# Patient Record
Sex: FEMALE | Race: OTHER | URBAN - METROPOLITAN AREA
[De-identification: names, ages, dates, MRNs, and addresses within clinical notes are randomized per-mention and may not be internally consistent; named-entity substitution may affect disease eponyms.]

---

## 2017-04-21 ENCOUNTER — APPOINTMENT (RX ONLY)
Dept: URBAN - METROPOLITAN AREA CLINIC 39 | Facility: CLINIC | Age: 52
Setting detail: DERMATOLOGY
End: 2017-04-21

## 2017-04-21 DIAGNOSIS — L40.0 PSORIASIS VULGARIS: ICD-10-CM | Status: WELL CONTROLLED

## 2017-04-21 PROCEDURE — ? PRESCRIPTION

## 2017-04-21 PROCEDURE — 99213 OFFICE O/P EST LOW 20 MIN: CPT

## 2017-04-21 RX ORDER — CALCIPOTRIENE AND BETAMETHASONE DIPROPIONATE 50; .5 UG/G; MG/G
OINTMENT TOPICAL
Qty: 1 | Refills: 7 | Status: ERX

## 2017-04-21 ASSESSMENT — LOCATION DETAILED DESCRIPTION DERM
LOCATION DETAILED: RIGHT DISTAL PRETIBIAL REGION
LOCATION DETAILED: LEFT PROXIMAL PRETIBIAL REGION
LOCATION DETAILED: LEFT ELBOW

## 2017-04-21 ASSESSMENT — LOCATION SIMPLE DESCRIPTION DERM
LOCATION SIMPLE: LEFT ELBOW
LOCATION SIMPLE: LEFT PRETIBIAL REGION
LOCATION SIMPLE: RIGHT PRETIBIAL REGION

## 2017-04-21 ASSESSMENT — BSA PSORIASIS: % BODY COVERED IN PSORIASIS: 2

## 2017-04-21 ASSESSMENT — LOCATION ZONE DERM
LOCATION ZONE: LEG
LOCATION ZONE: ARM

## 2017-04-21 ASSESSMENT — PGA PSORIASIS: PGA PSORIASIS: MILD (MILD PLAQUE ELEVATION, LIGHT ERYTHEMA, FINE SCALE PREDOMINATES)

## 2017-12-28 ENCOUNTER — APPOINTMENT (RX ONLY)
Dept: URBAN - METROPOLITAN AREA CLINIC 39 | Facility: CLINIC | Age: 52
Setting detail: DERMATOLOGY
End: 2017-12-28

## 2017-12-28 DIAGNOSIS — L70.0 ACNE VULGARIS: ICD-10-CM | Status: INADEQUATELY CONTROLLED

## 2017-12-28 PROCEDURE — ? COUNSELING

## 2017-12-28 PROCEDURE — ? TREATMENT REGIMEN

## 2017-12-28 PROCEDURE — ? PRESCRIPTION

## 2017-12-28 PROCEDURE — 99213 OFFICE O/P EST LOW 20 MIN: CPT

## 2017-12-28 RX ORDER — DOXYCYCLINE 40 MG/1
CAPSULE ORAL
Qty: 30 | Refills: 1 | Status: ERX | COMMUNITY
Start: 2017-12-28

## 2017-12-28 RX ADMIN — DOXYCYCLINE: 40 CAPSULE ORAL at 00:00

## 2017-12-28 ASSESSMENT — LOCATION SIMPLE DESCRIPTION DERM: LOCATION SIMPLE: LEFT CHEEK

## 2017-12-28 ASSESSMENT — LOCATION DETAILED DESCRIPTION DERM: LOCATION DETAILED: LEFT INFERIOR CENTRAL MALAR CHEEK

## 2017-12-28 ASSESSMENT — LOCATION ZONE DERM: LOCATION ZONE: FACE

## 2017-12-28 NOTE — HPI: PIMPLES (ACNE)
How Severe Is Your Acne?: mild
Is This A New Presentation, Or A Follow-Up?: Acne
Additional Comments (Use Complete Sentences): She was called in Rx about 3 weeks ago for benzoyl peroxide and clindamycin and she states that Rx’s are really not working well.

## 2017-12-28 NOTE — PROCEDURE: MIPS QUALITY
Quality 111:Pneumonia Vaccination Status For Older Adults: Pneumococcal Vaccination not Administered or Previously Received, Reason not Otherwise Specified
Quality 431: Preventive Care And Screening: Unhealthy Alcohol Use - Screening: Patient screened for unhealthy alcohol use using a single question and scores less than 2 times per year
Quality 130: Documentation Of Current Medications In The Medical Record: Current Medications Documented
Quality 131: Pain Assessment And Follow-Up: Pain assessment using a standardized tool is documented as negative, no follow-up plan required
Detail Level: Detailed
Quality 226: Preventive Care And Screening: Tobacco Use: Screening And Cessation Intervention: Patient screened for tobacco and never smoked
Quality 110: Preventive Care And Screening: Influenza Immunization: Influenza Immunization Administered during Influenza season

## 2021-09-28 ENCOUNTER — APPOINTMENT (RX ONLY)
Dept: URBAN - METROPOLITAN AREA CLINIC 39 | Facility: CLINIC | Age: 56
Setting detail: DERMATOLOGY
End: 2021-09-28

## 2021-09-28 DIAGNOSIS — L40.0 PSORIASIS VULGARIS: ICD-10-CM | Status: INADEQUATELY CONTROLLED

## 2021-09-28 PROCEDURE — ? DIAGNOSIS COMMENT

## 2021-09-28 PROCEDURE — ? ADDITIONAL NOTES

## 2021-09-28 PROCEDURE — ? PRESCRIPTION MEDICATION MANAGEMENT

## 2021-09-28 PROCEDURE — ? COUNSELING

## 2021-09-28 PROCEDURE — 99204 OFFICE O/P NEW MOD 45 MIN: CPT

## 2021-09-28 PROCEDURE — ? PRESCRIPTION

## 2021-09-28 RX ORDER — CALCIPOTRIENE AND BETAMETHASONE DIPROPIONATE 50; .5 UG/G; MG/G
AEROSOL, FOAM TOPICAL
Qty: 60 | Refills: 6 | Status: ERX | COMMUNITY
Start: 2021-09-28

## 2021-09-28 RX ADMIN — CALCIPOTRIENE AND BETAMETHASONE DIPROPIONATE: 50; .5 AEROSOL, FOAM TOPICAL at 00:00

## 2021-09-28 ASSESSMENT — BSA PSORIASIS: % BODY COVERED IN PSORIASIS: 30

## 2021-09-28 ASSESSMENT — PGA PSORIASIS: PGA PSORIASIS 2020: MODERATE

## 2021-09-28 NOTE — PROCEDURE: DIAGNOSIS COMMENT
Comment: Appointment with rheumatologist in November to EVAL for arthritis
Render Risk Assessment In Note?: no
Detail Level: Simple

## 2023-12-19 ENCOUNTER — HOSPITAL ENCOUNTER (EMERGENCY)
Facility: CLINIC | Age: 58
Discharge: HOME | End: 2023-12-19
Admitting: EMERGENCY MEDICINE

## 2023-12-19 ENCOUNTER — APPOINTMENT (OUTPATIENT)
Dept: GENERAL RADIOLOGY | Facility: CLINIC | Age: 58
End: 2023-12-19
Attending: EMERGENCY MEDICINE

## 2023-12-19 PROCEDURE — 99281 EMR DPT VST MAYX REQ PHY/QHP: CPT

## 2023-12-19 PROCEDURE — 73130 X-RAY EXAM OF HAND: CPT | Mod: RT

## 2023-12-20 NOTE — ED TRIAGE NOTES
[place]IB[place] for fall at [place], bag got caught and tripped. Caught herself with R hand, rating 5/10 pain. No deformity, Cms intact. VSS